# Patient Record
Sex: MALE | Race: WHITE | NOT HISPANIC OR LATINO | URBAN - METROPOLITAN AREA
[De-identification: names, ages, dates, MRNs, and addresses within clinical notes are randomized per-mention and may not be internally consistent; named-entity substitution may affect disease eponyms.]

---

## 2017-02-14 ENCOUNTER — OUTPATIENT (OUTPATIENT)
Dept: OUTPATIENT SERVICES | Facility: HOSPITAL | Age: 82
LOS: 1 days | End: 2017-02-14

## 2017-02-14 ENCOUNTER — INPATIENT (INPATIENT)
Facility: HOSPITAL | Age: 82
LOS: 2 days | Discharge: NURSING, REHAB OR LONG-TERM CARE | End: 2017-02-17
Payer: MEDICARE

## 2017-02-14 PROCEDURE — 99284 EMERGENCY DEPT VISIT MOD MDM: CPT

## 2017-02-14 PROCEDURE — 71010: CPT | Mod: 26

## 2017-02-14 PROCEDURE — 73503 X-RAY EXAM HIP UNI 4/> VIEWS: CPT | Mod: 26,LT

## 2017-02-14 PROCEDURE — 72170 X-RAY EXAM OF PELVIS: CPT | Mod: 26

## 2017-02-14 PROCEDURE — 73552 X-RAY EXAM OF FEMUR 2/>: CPT | Mod: 26,LT

## 2017-02-15 ENCOUNTER — OUTPATIENT (OUTPATIENT)
Dept: OUTPATIENT SERVICES | Facility: HOSPITAL | Age: 82
LOS: 1 days | End: 2017-02-15

## 2017-02-16 ENCOUNTER — OUTPATIENT (OUTPATIENT)
Dept: OUTPATIENT SERVICES | Facility: HOSPITAL | Age: 82
LOS: 1 days | End: 2017-02-16

## 2017-02-17 ENCOUNTER — OUTPATIENT (OUTPATIENT)
Dept: OUTPATIENT SERVICES | Facility: HOSPITAL | Age: 82
LOS: 1 days | End: 2017-02-17

## 2017-02-17 ENCOUNTER — INPATIENT (INPATIENT)
Facility: HOSPITAL | Age: 82
LOS: 21 days | Discharge: HOME | End: 2017-03-11

## 2018-02-13 ENCOUNTER — OUTPATIENT (OUTPATIENT)
Dept: OUTPATIENT SERVICES | Facility: HOSPITAL | Age: 83
LOS: 1 days | End: 2018-02-13

## 2018-02-13 ENCOUNTER — EMERGENCY (EMERGENCY)
Facility: HOSPITAL | Age: 83
LOS: 1 days | End: 2018-02-13
Payer: MEDICARE

## 2018-02-13 PROCEDURE — 99285 EMERGENCY DEPT VISIT HI MDM: CPT

## 2018-02-13 PROCEDURE — 71045 X-RAY EXAM CHEST 1 VIEW: CPT | Mod: 26

## 2022-01-24 PROBLEM — Z00.00 ENCOUNTER FOR PREVENTIVE HEALTH EXAMINATION: Status: ACTIVE | Noted: 2022-01-24

## 2022-05-09 ENCOUNTER — TRANSCRIPTION ENCOUNTER (OUTPATIENT)
Age: 87
End: 2022-05-09

## 2022-12-20 ENCOUNTER — TRANSCRIPTION ENCOUNTER (OUTPATIENT)
Age: 87
End: 2022-12-20

## 2023-01-24 PROBLEM — Z00.00 ENCOUNTER FOR PREVENTIVE HEALTH EXAMINATION: Noted: 2023-01-24

## 2023-01-31 ENCOUNTER — APPOINTMENT (OUTPATIENT)
Dept: NEUROLOGY | Facility: CLINIC | Age: 88
End: 2023-01-31

## 2023-02-09 ENCOUNTER — APPOINTMENT (OUTPATIENT)
Dept: NEUROLOGY | Facility: CLINIC | Age: 88
End: 2023-02-09
Payer: MEDICARE

## 2023-02-09 VITALS
DIASTOLIC BLOOD PRESSURE: 84 MMHG | BODY MASS INDEX: 20.69 KG/M2 | OXYGEN SATURATION: 96 % | SYSTOLIC BLOOD PRESSURE: 120 MMHG | WEIGHT: 124.2 LBS | HEIGHT: 65 IN | HEART RATE: 90 BPM

## 2023-02-09 DIAGNOSIS — I63.9 CEREBRAL INFARCTION, UNSPECIFIED: ICD-10-CM

## 2023-02-09 DIAGNOSIS — Z86.79 PERSONAL HISTORY OF OTHER DISEASES OF THE CIRCULATORY SYSTEM: ICD-10-CM

## 2023-02-09 DIAGNOSIS — H40.9 UNSPECIFIED GLAUCOMA: ICD-10-CM

## 2023-02-09 DIAGNOSIS — R41.89 OTHER SYMPTOMS AND SIGNS INVOLVING COGNITIVE FUNCTIONS AND AWARENESS: ICD-10-CM

## 2023-02-09 PROCEDURE — 99214 OFFICE O/P EST MOD 30 MIN: CPT

## 2023-02-09 RX ORDER — DESVENLAFAXINE SUCCINATE 50 MG/1
50 TABLET, EXTENDED RELEASE ORAL
Refills: 0 | Status: ACTIVE | COMMUNITY

## 2023-02-09 RX ORDER — APIXABAN 2.5 MG/1
2.5 TABLET, FILM COATED ORAL
Refills: 0 | Status: ACTIVE | COMMUNITY

## 2023-02-09 NOTE — PHYSICAL EXAM
[General Appearance - Alert] : alert [General Appearance - In No Acute Distress] : in no acute distress [Oriented To Time, Place, And Person] : oriented to person, place, and time [Impaired Insight] : insight and judgment were intact [Affect] : the affect was normal [Person] : oriented to person [Place] : oriented to place [Time] : oriented to time [Remote Intact] : remote memory intact [Concentration Intact] : normal concentrating ability [Visual Intact] : visual attention was ~T not ~L decreased [Naming Objects] : no difficulty naming common objects [Repeating Phrases] : no difficulty repeating a phrase [Writing A Sentence] : no difficulty writing a sentence [Fluency] : fluency intact [Comprehension] : comprehension intact [Reading] : reading intact [Cranial Nerves Optic (II)] : visual acuity intact bilaterally,  visual fields full to confrontation, pupils equal round and reactive to light [Cranial Nerves Oculomotor (III)] : extraocular motion intact [Cranial Nerves Trigeminal (V)] : facial sensation intact symmetrically [Cranial Nerves Facial (VII)] : face symmetrical [Cranial Nerves Vestibulocochlear (VIII)] : hearing was intact bilaterally [Cranial Nerves Glossopharyngeal (IX)] : tongue and palate midline [Cranial Nerves Accessory (XI - Cranial And Spinal)] : head turning and shoulder shrug symmetric [Cranial Nerves Hypoglossal (XII)] : there was no tongue deviation with protrusion [Motor Tone] : muscle tone was normal in all four extremities [Motor Strength] : muscle strength was normal in all four extremities [No Muscle Atrophy] : normal bulk in all four extremities [Motor Handedness Right-Handed] : the patient is right hand dominant [Sensation Tactile Decrease] : light touch was intact [Abnormal Walk] : normal gait [Balance] : balance was intact [Sclera] : the sclera and conjunctiva were normal [PERRL With Normal Accommodation] : pupils were equal in size, round, reactive to light, with normal accommodation [Extraocular Movements] : extraocular movements were intact [Outer Ear] : the ears and nose were normal in appearance [Hearing Threshold Finger Rub Not [place]] : hearing was normal [Neck Appearance] : the appearance of the neck was normal [Neck Cervical Mass (___cm)] : no neck mass was observed [Exaggerated Use Of Accessory Muscles For Inspiration] : no accessory muscle use [Edema] : there was no peripheral edema [] : no rash [Short Term Intact] : short term memory impaired [Past History] : inadequate knowledge of personal past history [Motor Strength Upper Extremities Bilaterally] : strength was normal in both upper extremities [Motor Strength Lower Extremities Bilaterally] : strength was normal in both lower extremities [Past-pointing] : there was no past-pointing [Tremor] : no tremor present [Plantar Reflex Right Only] : normal on the right [Plantar Reflex Left Only] : normal on the left [FreeTextEntry4] : abstract thought remains intact [FreeTextEntry8] : slow cautious gait

## 2023-02-09 NOTE — HISTORY OF PRESENT ILLNESS
[FreeTextEntry1] : Patient is an 87yo right handedmale with history of HTN, prediabetes, orthostatic hypotension,atrial flutter (not on AC due to hx falls) who presents for hospital follow up for recent diagnosis of stroke.  Patient initially presented related to new onset ambulatory dysfunction.  He was found to have subacute infarction on right parietal and insula on MRI brain.  The patient has falls about 1-2x monthly.  Patient is overall a poor historian.  He has mots of his falls in the setting of transition from seated to standing related to his orthostasis.  He takes aspirin 81mg and atorvastatin 80mg daily prior to hospitalization.  NIHSS=0 at PMH.  Patient was not a candidate for stroke intervention as LKW not entirely clear.  After discussion with patient’s daughter, the patient was started on low dose eliquis to mitigate his future risk of stroke.   He was not taking any statin therapy prior to hospitalization.  He was ultimately discharged from hospital to short term rehab.  The patient's daughter reported that his ambulation had acutely changed on the day of hospitalization.  \par \par Patient was hospitalized from 12/16-12/20.  He was discharged to [place] [facility].  He is now at a memory care unit for long-term care.  The patient is very hard of hearing.  He ambulates with use of walker and at times does not appear to have insight to his chronic ambulation problems needing assistive device.  He denies any problems with bleeding, particularly in stool or urine. He denies recent falls.  He seems to have fewer bruises now compared to prior to stroke, unclear if this is related to discontinuation of aspirin or increased safety practices in more formal setting.  He had a periods until early 2022 where he had orthostasis where had fallen.  This seems to be better now with slow transitions and has not been an active issue in nearly a year.  He denies recent falls, headache, diplopia, weakness, numbness, vertigo, pareshtesias, lightheadedness.  \par \par A1c 6.0 \par [id number]

## 2023-02-09 NOTE — DATA REVIEWED
[de-identified] : MRI brain without contrast: Two small areas of restricted diffusion in the right parietal lobe and  \par right insula suggesting acute infarcts.    [de-identified] : MRA head and neck: No hemodynamically significant stenosis or dissection identified within proximal intracranial and  neck vasculature.   \par No saccular intracranial aneurysm identified. \par CTH: No acute abnormality.

## 2023-02-09 NOTE — ASSESSMENT
[FreeTextEntry1] : Stroke type: Embolic\par Stroke etiology: Atrial flutter (off of anticoagulation due to falls)\par Stroke risk factors: Atrial flutter, HTN, male, age\par \par [id number] = 5 points (7.2% stroke risk per year)   \par HAS-BLED = 3 points (5.8% risk of bleeding) \par \par Antiplatelet: None, no indication at this time\par Anticoagulant: Eliquis 2.5mg BID \par Statin: Atorvastatin 80mg\par \par Cardiology: Referral placed \par \par Other elements of care:\par Goal LDL < 70, recheck fasting lipids (ordered)\par A1c: Acceptable based on age\par BP goals: <130/80, BP at goal (unless DM, then <120/80) \par Receiving therapy at [facility]\par \par Lifestyle: \par Diet habits assessed – MeDi counseling provided, refer to dietician \par PAL: Emphasize moderate intensity exercise for 30 minutes daily for 5 days, total 150 minutes of exercise weekly, build up aerobic exercise as tolerated\par \par Recommend follow up in 3 months to assess progression of stroke symptoms, review referrals (cardiology) and laboratory assessments (fasting lipids), and complete MoCA for formal cognitive assessment.\par

## 2023-03-15 ENCOUNTER — NON-APPOINTMENT (OUTPATIENT)
Age: 88
End: 2023-03-15

## 2023-03-15 LAB
CHOLEST SERPL-MCNC: 117 MG/DL
HDLC SERPL-MCNC: 48 MG/DL
LDLC SERPL CALC-MCNC: 47 MG/DL
NONHDLC SERPL-MCNC: 69 MG/DL
TRIGL SERPL-MCNC: 108 MG/DL

## 2023-04-26 ENCOUNTER — NON-APPOINTMENT (OUTPATIENT)
Age: 88
End: 2023-04-26

## 2023-04-26 ENCOUNTER — APPOINTMENT (OUTPATIENT)
Dept: CARDIOLOGY | Facility: CLINIC | Age: 88
End: 2023-04-26
Payer: MEDICARE

## 2023-04-26 VITALS
HEIGHT: 65 IN | WEIGHT: 123 LBS | HEART RATE: 74 BPM | BODY MASS INDEX: 20.49 KG/M2 | DIASTOLIC BLOOD PRESSURE: 65 MMHG | SYSTOLIC BLOOD PRESSURE: 146 MMHG | OXYGEN SATURATION: 96 %

## 2023-04-26 DIAGNOSIS — Z86.79 PERSONAL HISTORY OF OTHER DISEASES OF THE CIRCULATORY SYSTEM: ICD-10-CM

## 2023-04-26 DIAGNOSIS — R42 DIZZINESS AND GIDDINESS: ICD-10-CM

## 2023-04-26 DIAGNOSIS — I77.9 DISORDER OF ARTERIES AND ARTERIOLES, UNSPECIFIED: ICD-10-CM

## 2023-04-26 DIAGNOSIS — R73.03 PREDIABETES.: ICD-10-CM

## 2023-04-26 DIAGNOSIS — Z86.59 PERSONAL HISTORY OF OTHER MENTAL AND BEHAVIORAL DISORDERS: ICD-10-CM

## 2023-04-26 DIAGNOSIS — Z63.4 DISAPPEARANCE AND DEATH OF FAMILY MEMBER: ICD-10-CM

## 2023-04-26 DIAGNOSIS — Z86.69 PERSONAL HISTORY OF OTHER DISEASES OF THE NERVOUS SYSTEM AND SENSE ORGANS: ICD-10-CM

## 2023-04-26 DIAGNOSIS — Z82.3 FAMILY HISTORY OF STROKE: ICD-10-CM

## 2023-04-26 DIAGNOSIS — Z80.3 FAMILY HISTORY OF MALIGNANT NEOPLASM OF BREAST: ICD-10-CM

## 2023-04-26 DIAGNOSIS — U07.1 COVID-19: ICD-10-CM

## 2023-04-26 PROCEDURE — 93000 ELECTROCARDIOGRAM COMPLETE: CPT | Mod: 59

## 2023-04-26 PROCEDURE — 99214 OFFICE O/P EST MOD 30 MIN: CPT

## 2023-04-26 PROCEDURE — 93246 EXT ECG>7D<15D RECORDING: CPT

## 2023-04-26 SDOH — SOCIAL STABILITY - SOCIAL INSECURITY: DISSAPEARANCE AND DEATH OF FAMILY MEMBER: Z63.4

## 2023-04-27 PROBLEM — Z63.4 WIDOWER: Status: ACTIVE | Noted: 2023-04-27

## 2023-04-27 PROBLEM — Z86.69 HISTORY OF GLAUCOMA: Status: RESOLVED | Noted: 2023-04-27 | Resolved: 2023-04-27

## 2023-04-27 PROBLEM — Z86.59 HISTORY OF DEPRESSION: Status: RESOLVED | Noted: 2023-04-27 | Resolved: 2023-04-27

## 2023-04-27 PROBLEM — Z82.3 FAMILY HISTORY OF CEREBROVASCULAR ACCIDENT (CVA): Status: ACTIVE | Noted: 2023-04-27

## 2023-04-27 PROBLEM — R42 DIZZINESS: Status: ACTIVE | Noted: 2023-04-26

## 2023-04-27 PROBLEM — Z86.69 HISTORY OF DEAFNESS: Status: RESOLVED | Noted: 2023-04-27 | Resolved: 2023-04-27

## 2023-04-27 PROBLEM — Z86.59 HISTORY OF ANXIETY: Status: RESOLVED | Noted: 2023-04-27 | Resolved: 2023-04-27

## 2023-04-27 PROBLEM — U07.1 COVID-19 VIRUS INFECTION: Status: RESOLVED | Noted: 2023-04-27 | Resolved: 2023-04-27

## 2023-04-27 PROBLEM — Z80.3 FAMILY HISTORY OF MALIGNANT NEOPLASM OF BREAST: Status: ACTIVE | Noted: 2023-04-27

## 2023-04-27 NOTE — PHYSICAL EXAM
[Normal Conjunctiva] : normal conjunctiva [Normal S1, S2] : normal S1, S2 [Clear Lung Fields] : clear lung fields [Soft] : abdomen soft [Non Tender] : non-tender [Normal Bowel Sounds] : normal bowel sounds [Normal Gait] : normal gait [No Rash] : no rash [No Focal Deficits] : no focal deficits [de-identified] : Appears in no distress lying flat [de-identified] : Normocephalic [de-identified] : No murmur.  No gallop.  No diastolic sounds. [de-identified] : No peripheral edema.  Dorsalis pedis pulses +1-2 bilaterally.  Feet warm and well-perfused.  No ulcerations. [de-identified] : Pleasant

## 2023-04-27 NOTE — HISTORY OF PRESENT ILLNESS
[FreeTextEntry1] : 88-year-old man\par Cardiology consultation requested because of dizziness and atrial fibrillation\par \par \par Mr. Valencia has no history of a  myocardial infarction angina or congestive heart failure.  He has known paroxysmal atrial fibrillation/flutter.  In 12/22 he was admitted to [facility] with unsteady gait and was diagnosed as having a CVA.  MRI study showed a subacute infarct on the right parietal and insula.  Since that time he has been maintained on apixaban 2.5 mg twice daily.  There has been no recurrent neurological event.\par \danial Cruz has been troubled by episodes of dizziness during which time the blood pressure is reportedly normal  . On occasion simultaneously the heart rate is about 100/minute.  Previous treatment with metoprolol has been discontinued.  He denies symptoms of chest pain shortness of breath palpitations or syncope.\par \par There is a prior history of hypertension and hyperlipidemia.  Home blood pressure levels have been normal (see attached report)    There is no history of smoking.  His mother had a cerebrovascular accident.\par \danial Cruz presents today for cardiovascular evaluation.  He is accompanied by his daughter.\par

## 2023-04-27 NOTE — DISCUSSION/SUMMARY
[FreeTextEntry1] : Atrial flutter/fibrillation /dizziness\par The working diagnosis is paroxysmal atrial fibrillation/flutter secondary to hypertensive and probable atherosclerotic heart disease.  An elevated "[id number]" score is indicative of an increased risk of systemic/cerebral emboli.  The relatively low heart rate during atrial fibrillation (12/22 atrial fibrillation ventricular response 90/minute) in the absence of any AV dominik blocking agent is consistent with the presence of AV dominik sclerosis.  Symptoms of dizziness raise concern for an  as yet undetected bradycardia arrhythmia in the absence of systemic hypotension.  An electrocardiogram during symptoms would be most helpful for diagnosis.\par \par I have recommended the following\par a.  Continue apixaban 2.5 mg twice daily \par b.  Zio patch/mobile telemetry study\par \par \par \par Hypertension\par Hypertension appears to be controlled at the present time without  any antihypertensive medical therapy.  In view of concerns for systemic hypotension (see home blood pressure monitoring reports) permissive treatment for hypertension is indicated.\par \par I have recommended the following\par a.  Avoidance of antihypertensive medical therapy\par \par \par \par Hyperlipidemia\par Hyperlipidemia represents a risk factor for progressive atherosclerotic disease.  In a patient with a prior history of a cerebrovascular accident the target LDL level is about 70.  [employer] a reductase inhibitor therapy has been effective.  In 3/23 the serum cholesterol level was 117 triglycerides 108 HDL 48 and LDL 47.  The risk versus benefit of extraordinarily low lipid levels is controversial.\par \par I have recommended the following\par a.  Decrease atorvastatin dose from 80 mg/day to 40 mg/day\par b.  Target LDL level to about 70 as discussed above\par c   routine laboratory studies including lipid profile through primary care Dr. Bhatia\par d.  Low-fat low-cholesterol heart healthy diet.  Regular exercise to the extent possible\par \par \par \par The diagnosis, prognosis, risks, options and alternatives were explained at length to the patient and family.  All questions were answered issues discussed included atrial fibrillation/flutter/dizziness hypertension hypotension antihypertensive medical therapy hyperlipidemia noninvasive cardiac testing diet and exercise.

## 2023-05-21 PROCEDURE — 93248 EXT ECG>7D<15D REV&INTERPJ: CPT

## 2023-06-21 ENCOUNTER — NON-APPOINTMENT (OUTPATIENT)
Age: 88
End: 2023-06-21

## 2023-06-21 ENCOUNTER — APPOINTMENT (OUTPATIENT)
Dept: CARDIOLOGY | Facility: CLINIC | Age: 88
End: 2023-06-21
Payer: MEDICARE

## 2023-06-21 VITALS
HEART RATE: 122 BPM | DIASTOLIC BLOOD PRESSURE: 70 MMHG | SYSTOLIC BLOOD PRESSURE: 118 MMHG | OXYGEN SATURATION: 99 % | HEIGHT: 65 IN | WEIGHT: 125 LBS | BODY MASS INDEX: 20.83 KG/M2

## 2023-06-21 DIAGNOSIS — Z86.79 PERSONAL HISTORY OF OTHER DISEASES OF THE CIRCULATORY SYSTEM: ICD-10-CM

## 2023-06-21 DIAGNOSIS — Z86.73 PERSONAL HISTORY OF TRANSIENT ISCHEMIC ATTACK (TIA), AND CEREBRAL INFARCTION W/OUT RESIDUAL DEFICITS: ICD-10-CM

## 2023-06-21 DIAGNOSIS — I48.92 UNSPECIFIED ATRIAL FLUTTER: ICD-10-CM

## 2023-06-21 PROCEDURE — 93000 ELECTROCARDIOGRAM COMPLETE: CPT

## 2023-06-21 PROCEDURE — 99214 OFFICE O/P EST MOD 30 MIN: CPT

## 2023-06-21 RX ORDER — ATORVASTATIN CALCIUM 80 MG/1
TABLET, FILM COATED ORAL
Refills: 0 | Status: ACTIVE | COMMUNITY

## 2023-06-21 NOTE — HISTORY OF PRESENT ILLNESS
[FreeTextEntry1] : 88-year-old man\par Routine follow-up\par \par  denies chest pain palpitations, shortness of breath ankle edema orthopnea or syncope.  His daughter Heath reports "no new events, he has been good."

## 2023-06-21 NOTE — PHYSICAL EXAM
[Normal Conjunctiva] : normal conjunctiva [Normal S1, S2] : normal S1, S2 [Clear Lung Fields] : clear lung fields [Soft] : abdomen soft [Non Tender] : non-tender [Normal Bowel Sounds] : normal bowel sounds [Normal Gait] : normal gait [No Rash] : no rash [No Focal Deficits] : no focal deficits [de-identified] : Appears in no distress lying flat [de-identified] : Normocephalic [de-identified] : No murmur.  No gallop.  No diastolic sounds. [de-identified] : No peripheral edema.  Dorsalis pedis pulses +1-2 bilaterally.  Feet warm and well-perfused.  No ulcerations. [de-identified] : Pleasant

## 2023-06-21 NOTE — DISCUSSION/SUMMARY
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  In 12/22 he presented with unsteady gait and was diagnosed as having a cerebrovascular accident with a subacute infarct in the right parietal and insula.  Electrocardiogram on presentation showed atrial fibrillation with a ventricular response of 90/minute.  Sinus rhythm was restored spontaneously.  He has been maintained on apixaban since that time.  Previous treatment with metoprolol was not tolerated.  A 4/23 Zio patch mobile telemetry study revealed sinus rhythm 50–130/minute paroxysmal supraventricular tachycardia 120–190/minute was reported the longest interval being 2 hours in duration.    An elevated "[id number]" score is indicative of an increased risk of systemic/cerebral emboli.  The relatively low heart rate during atrial fibrillation (12/22 atrial fibrillation ventricular response 90/minute) in the absence of any AV dominik blocking agent is consistent with the presence of AV dominik sclerosis..  Due to concerns for systemic hypotension/syncope and bradycardia, in my judgment the risk of AV dominik blocking agents outweigh any potential benefit.  In view of the lack of symptoms and the patient's clinical course continuing the present medical management appears most attractive..\par \par I have recommended the following\par a.  Continue apixaban 2.5 mg twice daily \par b.  No further cardiac testing for this problem at this time \par \par \par \par Hypertension\par Hypertension appears to be controlled at the present time without  any antihypertensive medical therapy.  In view of concerns for systemic hypotension (see home blood pressure monitoring reports) permissive treatment for hypertension is indicated.\par \par I have recommended the following\par a.  Avoidance of antihypertensive medical therapy\par \par \par \par Hyperlipidemia\par Hyperlipidemia represents a risk factor for progressive atherosclerotic disease.  In a patient with a prior history of a cerebrovascular accident the target LDL level is about 70.  [employer] a reductase inhibitor therapy has been effective.  In 3/23 the serum cholesterol level was 117 triglycerides 108 HDL 48 and LDL 47.  The dose of atorvastatin was then decreased from 80 mg to the present 40 mg/day.  .\par \par I have recommended the following\par a.  Continue the present medical regimen \par b.  Target LDL level to about 70 as discussed above\par c   routine laboratory studies including lipid profile through primary care Dr. Bhatia\par d.  Low-fat low-cholesterol heart healthy diet.  Regular exercise to the extent possible\par \par \par \par The diagnosis, prognosis, risks, options and alternatives were explained at length to the patient and family.  All questions were answered issues discussed included paroxysmal supraventricular tachycardia/atrial fibrillation/atrial flutter cardioembolic stroke bleeding complications due to the administration of anticoagulation hyperlipidemia noninvasive cardiac testing diet and exercise \par \par \par \par Counseling and/or coordination of care\par Time was a significant factor for this patient encounter.  Total time spent with the patient and family was 30 minutes.  Greater than 50% of the time was devoted to counseling and/or coordination of care..

## 2023-12-20 ENCOUNTER — NON-APPOINTMENT (OUTPATIENT)
Age: 88
End: 2023-12-20

## 2023-12-20 ENCOUNTER — APPOINTMENT (OUTPATIENT)
Dept: CARDIOLOGY | Facility: CLINIC | Age: 88
End: 2023-12-20
Payer: MEDICARE

## 2023-12-20 VITALS
DIASTOLIC BLOOD PRESSURE: 68 MMHG | WEIGHT: 126 LBS | HEART RATE: 69 BPM | OXYGEN SATURATION: 98 % | BODY MASS INDEX: 20.97 KG/M2 | SYSTOLIC BLOOD PRESSURE: 125 MMHG

## 2023-12-20 DIAGNOSIS — F32.A ANXIETY DISORDER, UNSPECIFIED: ICD-10-CM

## 2023-12-20 DIAGNOSIS — F41.9 ANXIETY DISORDER, UNSPECIFIED: ICD-10-CM

## 2023-12-20 DIAGNOSIS — E78.5 HYPERLIPIDEMIA, UNSPECIFIED: ICD-10-CM

## 2023-12-20 DIAGNOSIS — I48.91 UNSPECIFIED ATRIAL FIBRILLATION: ICD-10-CM

## 2023-12-20 DIAGNOSIS — I10 ESSENTIAL (PRIMARY) HYPERTENSION: ICD-10-CM

## 2023-12-20 PROCEDURE — 93000 ELECTROCARDIOGRAM COMPLETE: CPT

## 2023-12-20 PROCEDURE — 99213 OFFICE O/P EST LOW 20 MIN: CPT

## 2023-12-25 PROBLEM — I10 HYPERTENSION: Status: ACTIVE | Noted: 2023-02-09

## 2023-12-25 PROBLEM — I48.91 ATRIAL FIBRILLATION: Status: ACTIVE | Noted: 2023-04-27

## 2023-12-25 PROBLEM — E78.5 HYPERLIPIDEMIA: Status: ACTIVE | Noted: 2023-02-09

## 2023-12-25 PROBLEM — F41.9 ANXIETY AND DEPRESSION: Status: ACTIVE | Noted: 2023-02-09

## 2023-12-25 NOTE — HISTORY OF PRESENT ILLNESS
[FreeTextEntry1] : 89-year-old man Routine follow-up for atrial fibrillation/flutter/supraventricular tachycardia/hyperlipidemia and hypertension  "I feel okay."   denies exertional chest pain, palpitations, shortness of breath or syncope.  He does experience fleeting anterior chest discomfort at rest without associated diaphoresis or dyspnea.  Mr. Lam is accompanied today by his daughter who reports "no issues" since his last visit here 6 months ago

## 2023-12-25 NOTE — PHYSICAL EXAM
[Normal Conjunctiva] : normal conjunctiva [Normal S1, S2] : normal S1, S2 [Clear Lung Fields] : clear lung fields [Soft] : abdomen soft [Non Tender] : non-tender [Normal Bowel Sounds] : normal bowel sounds [Normal Gait] : normal gait [No Rash] : no rash [No Focal Deficits] : no focal deficits [de-identified] : Appears in no distress lying flat [de-identified] : No murmur.  No gallop.  No diastolic sounds. [de-identified] : Normocephalic [de-identified] : No peripheral edema.  Dorsalis pedis pulses +1-2 bilaterally.  Feet warm and well-perfused.  No ulcerations. [de-identified] : Pleasant

## 2023-12-25 NOTE — DISCUSSION/SUMMARY
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  In 12/22 he presented with unsteady gait and was diagnosed as having a cerebrovascular accident with a subacute infarct in the right parietal and insula.  Electrocardiogram on presentation showed atrial fibrillation with a ventricular response of 90/minute.  Sinus rhythm was restored spontaneously.  He has been maintained on apixaban since that time.  Previous treatment with metoprolol was not tolerated.  A 4/23 Zio patch mobile telemetry study revealed sinus rhythm /minute paroxysmal supraventricular tachycardia 120-190/minute was reported the longest interval being 2 hours in duration.    An elevated "[id number]" score is indicative of an increased risk of systemic/cerebral emboli.  The relatively low heart rate during atrial fibrillation (12/22 atrial fibrillation ventricular response 90/minute) in the absence of any AV dominik blocking agent is consistent with the presence of AV dominik sclerosis..  Due to concerns for systemic hypotension/syncope and bradycardia, in my judgment the risk of AV dominik blocking agents outweigh any potential benefit.  In view of the lack of symptoms and the patient's clinical course continuing the present medical management appears most attractive..  I have recommended the following a.  Continue apixaban 2.5 mg twice daily  b.  No further cardiac testing for this problem at this time     Hypertension Hypertension appears to be controlled at the present time without  any antihypertensive medical therapy.  In view of concerns for systemic hypotension (see home blood pressure monitoring reports) permissive treatment for hypertension is indicated.  I have recommended the following a.  Avoidance of antihypertensive medical therapy    Hyperlipidemia Hyperlipidemia represents a risk factor for progressive atherosclerotic disease.  In a patient with a prior history of a cerebrovascular accident the target LDL level is about 70.  [employer] a reductase inhibitor therapy has been effective.  In 3/23 the serum cholesterol level was 117 triglycerides 108 HDL 48 and LDL 47.  The dose of atorvastatin was then decreased from 80 mg to the present 40 mg/day.  .  I have recommended the following a.  Continue the present medical regimen  b.  Target LDL level to about 70 as discussed above c   routine laboratory studies including lipid profile through primary care Dr. Jannette loaiza  Low-fat low-cholesterol heart healthy diet.  Regular exercise to the extent possible    The diagnosis, prognosis, risks, options and alternatives were explained at length to the patient and family.  All questions were answered issues discussed included paroxysmal supraventricular tachycardia/atrial fibrillation/atrial flutter cardioembolic stroke bleeding complications due to the administration of anticoagulation hyperlipidemia noninvasive cardiac testing diet and exercise     Counseling and/or coordination of care Time was a significant factor for this patient encounter.  Total time spent with the patient and family was 25  minutes.  Greater than 50% of the time was devoted to counseling and/or coordination of care..

## 2024-04-01 ENCOUNTER — APPOINTMENT (OUTPATIENT)
Dept: GASTROENTEROLOGY | Facility: CLINIC | Age: 89
End: 2024-04-01
Payer: MEDICARE

## 2024-04-01 VITALS
DIASTOLIC BLOOD PRESSURE: 65 MMHG | BODY MASS INDEX: 20.83 KG/M2 | RESPIRATION RATE: 15 BRPM | HEIGHT: 65 IN | HEART RATE: 51 BPM | WEIGHT: 125 LBS | OXYGEN SATURATION: 98 % | SYSTOLIC BLOOD PRESSURE: 105 MMHG

## 2024-04-01 DIAGNOSIS — K62.89 OTHER SPECIFIED DISEASES OF ANUS AND RECTUM: ICD-10-CM

## 2024-04-01 DIAGNOSIS — R93.89 ABNORMAL FINDINGS ON DIAGNOSTIC IMAGING OF OTHER SPECIFIED BODY STRUCTURES: ICD-10-CM

## 2024-04-01 PROCEDURE — 99204 OFFICE O/P NEW MOD 45 MIN: CPT

## 2024-04-01 PROCEDURE — G2211 COMPLEX E/M VISIT ADD ON: CPT

## 2024-04-01 RX ORDER — PANTOPRAZOLE SODIUM 40 MG/1
40 TABLET, DELAYED RELEASE ORAL
Refills: 0 | Status: ACTIVE | COMMUNITY

## 2024-04-01 RX ORDER — DOCUSATE SODIUM 100 MG/1
100 CAPSULE ORAL
Refills: 0 | Status: ACTIVE | COMMUNITY

## 2024-04-01 NOTE — ASSESSMENT
[FreeTextEntry1] : 1. CT imaging reviewed with patient and family member  2. Colonsocopy and Flex sigmoioscopy with R/B/A to both procedures discussed. including the increase risk of bleeding while on eliquis and the need to come off eliquis for 48 hrs prior if approved by cardiology prior to colonoscopy.  3. At this time patient and family would like to further discuss endoscopical procedure with Gastroenterologist; Dr. Montero prior to making a final decision. Dr. Montero has been notified 4. Further treatment will depend on patient's wishes moving forward.  5. Family and patient was in agreement wo perform labs in the office today including a CEA marker.  6. Aware of alarmign sx that would prompt an urgent ED visit.

## 2024-04-01 NOTE — PHYSICAL EXAM
[Alert] : alert [Normal Voice/Communication] : normal voice/communication [Healthy Appearing] : healthy appearing [No Acute Distress] : no acute distress [Sclera] : the sclera and conjunctiva were normal [Hearing Threshold Finger Rub Not [place]] : hearing was normal [Normal Lips/Gums] : the lips and gums were normal [Normal Appearance] : the appearance of the neck was normal [Oropharynx] : the oropharynx was normal [No Respiratory Distress] : no respiratory distress [No Acc Muscle Use] : no accessory muscle use [No Neck Mass] : no neck mass was observed [Respiration, Rhythm And Depth] : normal respiratory rhythm and effort [Auscultation Breath Sounds / Voice Sounds] : lungs were clear to auscultation bilaterally [Normal S1, S2] : normal S1 and S2 [Heart Rate And Rhythm] : heart rate was normal and rhythm regular [Bowel Sounds] : normal bowel sounds [Murmurs] : no murmurs [Abdomen Tenderness] : non-tender [No Masses] : no abdominal mass palpated [Abdomen Soft] : soft [Oriented To Time, Place, And Person] : oriented to person, place, and time [] : no hepatosplenomegaly

## 2024-04-01 NOTE — HISTORY OF PRESENT ILLNESS
[FreeTextEntry1] : Patient is an 88 yo M with PMHX of dementia. HTN, HLD, CVA/TIA, Afib ( Eliquis)   Presents to the office today alongside his daughter Gisselle. Lives in " [facility]"  an assisted living facility. Walks with a walker.   Mr. Montaño visisted [facility] on 02/17/2024 for abdominal distention and weakness. CT imaging was performed which revealed : A Masslike mural thickening of the rectum with subjacent nodularity in the mesorectal fat, suspicious for malignancy,  In the office today patient reports he saw some BRBPR 2 weeks ago during a bowel movement and when wiping. Intermittent episodes have been occuring like this from time to time over the past year. Feeling more constipated then usual and the sensation of incompletle emptying episodes. Due to symptoms of constipation he has been using stool softners and having more of a bland softer diet to help him with digestion.   Abdominal bloating has subsided. No bloody Bm's since 2 weeks ago.  Bms occur daily; soft-hard requiring pushing and straining. Unaware of any specific triggering foods.   Denies Fever, chills, unint wt loss/ wt gain, dizziness, headaches, chest pain, SOB, Chronic cough, reflux, heartburn, dysphagia, early satiety, food avoidanceN/V/C/D, Jaundice, steatorrhea, sick contacts nor travel.  Patient and daughter do have some concerns for diagnostic colonoscopy due to known h/o CVA and afib on eliquis.   Last colonsocopy was prior to turning 80 years old by report.   Denies Fever, chills, unint wt loss/ wt gain, dizziness, headaches, chest pain, SOB, Chronic cough, reflux, heartburn, dysphagia, early satiety, food avoidance, abd pain, bloating, cramping, N/V/C/D, BRBPR, black stool, Jaundice, steatorrhea, sick contacts nor travel.  Fam Hx: Denies any GI malignancies in the family  Social Hx: Denies etoh use,, smoking nor illicit drug use.

## 2024-04-05 LAB
ALBUMIN SERPL ELPH-MCNC: 4.2 G/DL
ALP BLD-CCNC: 136 U/L
ALT SERPL-CCNC: 16 U/L
ANION GAP SERPL CALC-SCNC: 16 MMOL/L
AST SERPL-CCNC: 25 U/L
BASOPHILS # BLD AUTO: 0.05 K/UL
BASOPHILS NFR BLD AUTO: 0.6 %
BILIRUB SERPL-MCNC: 0.3 MG/DL
BUN SERPL-MCNC: 29 MG/DL
CALCIUM SERPL-MCNC: 9.9 MG/DL
CEA SERPL-MCNC: 12.4 NG/ML
CHLORIDE SERPL-SCNC: 101 MMOL/L
CO2 SERPL-SCNC: 21 MMOL/L
CREAT SERPL-MCNC: 1.3 MG/DL
CRP SERPL-MCNC: <3 MG/L
EGFR: 53 ML/MIN/1.73M2
EOSINOPHIL # BLD AUTO: 0.18 K/UL
EOSINOPHIL NFR BLD AUTO: 2.3 %
GLUCOSE SERPL-MCNC: 135 MG/DL
HCT VFR BLD CALC: 30.3 %
HGB BLD-MCNC: 9.2 G/DL
IMM GRANULOCYTES NFR BLD AUTO: 0.4 %
LYMPHOCYTES # BLD AUTO: 2.17 K/UL
LYMPHOCYTES NFR BLD AUTO: 28 %
MAN DIFF?: NORMAL
MCHC RBC-ENTMCNC: 28 PG
MCHC RBC-ENTMCNC: 30.4 GM/DL
MCV RBC AUTO: 92.4 FL
MONOCYTES # BLD AUTO: 0.81 K/UL
MONOCYTES NFR BLD AUTO: 10.4 %
NEUTROPHILS # BLD AUTO: 4.52 K/UL
NEUTROPHILS NFR BLD AUTO: 58.3 %
PLATELET # BLD AUTO: 262 K/UL
POTASSIUM SERPL-SCNC: 4.7 MMOL/L
PROT SERPL-MCNC: 7.4 G/DL
RBC # BLD: 3.28 M/UL
RBC # FLD: 13.6 %
SODIUM SERPL-SCNC: 138 MMOL/L
WBC # FLD AUTO: 7.76 K/UL

## 2024-04-14 ENCOUNTER — NON-APPOINTMENT (OUTPATIENT)
Age: 89
End: 2024-04-14

## 2024-04-23 ENCOUNTER — TRANSCRIPTION ENCOUNTER (OUTPATIENT)
Age: 89
End: 2024-04-23